# Patient Record
Sex: MALE | Race: WHITE | NOT HISPANIC OR LATINO | Employment: FULL TIME | URBAN - METROPOLITAN AREA
[De-identification: names, ages, dates, MRNs, and addresses within clinical notes are randomized per-mention and may not be internally consistent; named-entity substitution may affect disease eponyms.]

---

## 2021-09-02 ENCOUNTER — APPOINTMENT (OUTPATIENT)
Dept: GENERAL RADIOLOGY | Facility: HOSPITAL | Age: 61
End: 2021-09-02

## 2021-09-02 PROCEDURE — 73030 X-RAY EXAM OF SHOULDER: CPT | Performed by: FAMILY MEDICINE

## 2021-09-08 ENCOUNTER — TREATMENT (OUTPATIENT)
Dept: PHYSICAL THERAPY | Facility: CLINIC | Age: 61
End: 2021-09-08

## 2021-09-08 ENCOUNTER — TRANSCRIBE ORDERS (OUTPATIENT)
Dept: OCCUPATIONAL THERAPY | Facility: CLINIC | Age: 61
End: 2021-09-08

## 2021-09-08 DIAGNOSIS — S46.912D STRAIN OF LEFT SHOULDER, SUBSEQUENT ENCOUNTER: Primary | ICD-10-CM

## 2021-09-08 DIAGNOSIS — S46.912D LEFT SHOULDER STRAIN, SUBSEQUENT ENCOUNTER: Primary | ICD-10-CM

## 2021-09-08 DIAGNOSIS — R68.89 IMPAIRED FUNCTION OF UPPER EXTREMITY: ICD-10-CM

## 2021-09-08 PROCEDURE — 97110 THERAPEUTIC EXERCISES: CPT | Performed by: PHYSICAL THERAPIST

## 2021-09-08 PROCEDURE — 97014 ELECTRIC STIMULATION THERAPY: CPT | Performed by: PHYSICAL THERAPIST

## 2021-09-08 PROCEDURE — 97162 PT EVAL MOD COMPLEX 30 MIN: CPT | Performed by: PHYSICAL THERAPIST

## 2021-09-08 PROCEDURE — 97140 MANUAL THERAPY 1/> REGIONS: CPT | Performed by: PHYSICAL THERAPIST

## 2021-09-08 NOTE — PROGRESS NOTES
Physical Therapy Initial Evaluation and Plan of Care    Patient: Arden Jaramillo   : 1960  Diagnosis/ICD-10 Code:  Strain of left shoulder, subsequent encounter [S46.912D]  Referring practitioner: OSKAR Mccartney    Subjective Evaluation    History of Present Illness  Date of onset: 2021  Mechanism of injury: While pulling materials off of shelf, tripped over material and fell landing on left shoulder - unable to raise the shoulder after wards  Went to Urgent Care - Xrays & Diclofenac  Got meds filled  [facility] next day - cont with Diclofenac and referred to PT  Has grandchildren, hunts, fishes, boats, [place], [place]       Patient Occupation: Manufacturing, , shipping receiving large rubber materials Pain  Current pain ratin  At best pain ratin  At worst pain ratin  Location: superior/anterior left shoulder, had some 5th digit tingling, cant raise his arm, does have some popping  Quality: knife-like, sharp and dull ache  Relieving factors: rest, support, medications and ice  Aggravating factors: lifting, outstretched reach, repetitive movement, prolonged positioning, sleeping and overhead activity (pronation/supination)  Progression: improved    Social Support  Lives with: spouse    Hand dominance: left    Diagnostic Tests  X-ray: normal    Treatments  Previous treatment: medication  Current treatment: medication and physical therapy  Patient Goals  Patient goal: get better, through darts without pain           Objective          Postural Observations  Seated posture: poor  Standing posture: poor    Additional Postural Observation Details  Protraction of shoulders    Observations     Additional Shoulder Observation Details  Protracted shoulders    Palpation     Right   Hypertonic in the upper trapezius. Tenderness of the anterior deltoid, biceps and supraspinatus.     Tenderness     Right Shoulder  Tenderness in the biceps tendon (proximal), bicipital groove and  supraspinatus tendon.     Neurological Testing     Sensation     Shoulder   Left Shoulder   Intact: light touch    Active Range of Motion   Left Shoulder   Flexion: 40 degrees with pain  Extension: 30 degrees with pain  Abduction: 22 degrees   External rotation 45°: 10 degrees with pain  Internal rotation 45°: 20 degrees with pain    Passive Range of Motion   Left Shoulder   Flexion: 50 degrees with pain  Abduction: 45 degrees with pain  External rotation 45°: 30 degrees with pain  Internal rotation 45°: 40 degrees with pain    Strength/Myotome Testing     Left Shoulder     Planes of Motion   Flexion: 3-   Extension: 4   Abduction: 3-   External rotation at 0°: 3-   Internal rotation at 0°: 4     Additional Strength Details  Pain inhibition with all strength testing of the left shoulder     Tests     Left Shoulder   Positive drop arm.   Negative AC shear.     Additional Tests Details  Unable to tolerate much testing due to pain and guarding of the shoulder          Assessment & Plan     Assessment  Impairments: abnormal muscle firing, abnormal muscle tone, abnormal or restricted ROM, activity intolerance, impaired physical strength, lacks appropriate home exercise program and pain with function  Assessment details: 61 y.o. male with left shoulder strain after falling and landing on the shoulder presents with: 1 constant left shoulder pain, 2. Decreased shoulder AROM, 3. Significantly decreased muscle control of shoulder, 4. Positive Drop Arm test, 5. Suboptimal exam due to severity of symptoms  Prognosis: fair  Functional Limitations: carrying objects, lifting, uncomfortable because of pain, reaching behind back, reaching overhead and unable to perform repetitive tasks  Goals  Plan Goals: Short Term Goals: 2 weeks  Patient will be able to tolerate initial exercises  Patient will have pain <5/10  Patient will be able to sleep without pain interruption  Patient will be able to actively flex his shoulder to 90* to aid  in ADL's    Long Term Goals: 4 weeks  Patient will be independent in performing home exercise program.  Patient will have functional pain free shoulder AROM  Patient will be able to open and close his car door without increased symptoms  Patient will return to work with min/no restrictions      Plan  Therapy options: will be seen for skilled physical therapy services  Planned modality interventions: ultrasound, cryotherapy and TENS  Planned therapy interventions: manual therapy, strengthening, stretching, joint mobilization, postural training and home exercise program  Frequency: 3x week  Duration in visits: 12  Duration in weeks: 4  Treatment plan discussed with: patient  Plan details: Access Code: [email or web address]  URL: [email or web address]  Date: 09/08/2021  Prepared by: Asya Laguerre    Exercises  Circular Shoulder Pendulum with Table Support - 1 x daily - 7 x weekly - 1 sets - 10 reps  Seated Shoulder Flexion Towel Slide at Table Top - 1 x daily - 7 x weekly - 1 sets - 10 reps  Standing Elbow Flexion Extension AROM - 1 x daily - 7 x weekly - 3 sets - 10 reps  Wrist Circumduction AROM - 1 x daily - 7 x weekly - 3 sets - 10 reps          Manual Therapy:    14     mins  78631;  Therapeutic Exercise:    10     mins  80993;     Neuromuscular Evelyn:    0    mins  75474;    Therapeutic Activity:     5     mins  05679; Rehab process    Ultrasound                       8 mins    Evaluation Time:     20  mins  Timed Treatment:   37   mins   Total Treatment:     75   mins    PT SIGNATURE: Asya Laguerre, PT   DATE TREATMENT INITIATED: 9/8/2021    Initial Certification  Certification Period: 12/7/2021  I certify that the therapy services are furnished while this patient is under my care.  The services outlined above are required by this patient, and will be reviewed every 90 days.     PHYSICIAN: Steve Coon, OSKAR ______________________________________      DATE: ____________________________    Please sign and return via  fax to [phone].. Thank you, [facility].

## 2021-09-10 ENCOUNTER — TREATMENT (OUTPATIENT)
Dept: PHYSICAL THERAPY | Facility: CLINIC | Age: 61
End: 2021-09-10

## 2021-09-10 DIAGNOSIS — R68.89 IMPAIRED FUNCTION OF UPPER EXTREMITY: ICD-10-CM

## 2021-09-10 DIAGNOSIS — S46.912D STRAIN OF LEFT SHOULDER, SUBSEQUENT ENCOUNTER: Primary | ICD-10-CM

## 2021-09-10 PROCEDURE — 97110 THERAPEUTIC EXERCISES: CPT | Performed by: PHYSICAL THERAPIST

## 2021-09-10 PROCEDURE — 97014 ELECTRIC STIMULATION THERAPY: CPT | Performed by: PHYSICAL THERAPIST

## 2021-09-10 PROCEDURE — 97035 APP MDLTY 1+ULTRASOUND EA 15: CPT | Performed by: PHYSICAL THERAPIST

## 2021-09-10 PROCEDURE — 97140 MANUAL THERAPY 1/> REGIONS: CPT | Performed by: PHYSICAL THERAPIST

## 2021-09-10 NOTE — PROGRESS NOTES
Physical Therapy Daily Progress Note        VISIT#: 2      Arden Jaramillo reports: His L shoulder still has a lot of pain but it is a bit better today. His exercises seem to be helping and he can move his shoulder more than he could on Wednesday. He has difficulty abducting and flexing/extending his shoulder. Stated he is getting tingling down his arm and into his fingers - mostly in his pinky.   Current Pain Level:    1-2/10; Worst:   8/10; Best:  1-2/10  Location Of Pain: L shoulder, sub acromial  Response to Previous Session: Good. No issues  Functional Deficits/Irritating Factors: Sleeping/lying on his arm, reaching, lifting, carrying, pushing, pulling  Progression: improving  Compliance with HEP Reported: Yes    Objective   Presents: guarded posture w/LUE  Increased sets/reps of:  none   Increased resistance on:  none  Added to Program: Scapular pinches, Incline table slides, pulleys    PROM L shoulder flexion = approx 60 deg; ER = approx 45 deg; Abd = approx 80 deg      See Exercise, Manual, and Modality Logs for complete treatment.     Patient Education: Pt was educated on exercise biomechanical correctness, intensity, and speed.     Assessment:  Pt is improving. He is moving his shoulder more and his range has increased a little. Pain and guarding are significant. Was able to introduce a couple of new exercise interventions and were tolerated well. Pt will continue to benefit from skilled PT interventions to address current functional deficits and impairments.       Plan: Progress to/Continue with current program. Progress per pt tolerance with RC strengthening and scapular stabilization.         Manual Therapy:    10     mins  68211;  Ultrasound:   10 mins 97842  Electrical Stimulation: __15____ mins 57242/  Iontophoresis: 0 mins 13770  Traction: 0 mins  13263  Work Conditionin mins 83500  Therapeutic Exercise:    20     mins  91937;     Neuromuscular Evelyn:    0    mins  53004;    Therapeutic  Activity:     0     mins  36179;     Timed Treatment:   30   mins   Total Treatment:     55   mins    Alison Herrera, TAMRA  [place] License # [id number]  Physical Therapist Assistant

## 2021-09-14 ENCOUNTER — TREATMENT (OUTPATIENT)
Dept: PHYSICAL THERAPY | Facility: CLINIC | Age: 61
End: 2021-09-14

## 2021-09-14 DIAGNOSIS — S46.912D STRAIN OF LEFT SHOULDER, SUBSEQUENT ENCOUNTER: Primary | ICD-10-CM

## 2021-09-14 DIAGNOSIS — R68.89 IMPAIRED FUNCTION OF UPPER EXTREMITY: ICD-10-CM

## 2021-09-14 PROCEDURE — 97140 MANUAL THERAPY 1/> REGIONS: CPT | Performed by: PHYSICAL THERAPIST

## 2021-09-14 PROCEDURE — 97110 THERAPEUTIC EXERCISES: CPT | Performed by: PHYSICAL THERAPIST

## 2021-09-14 PROCEDURE — 97530 THERAPEUTIC ACTIVITIES: CPT | Performed by: PHYSICAL THERAPIST

## 2021-09-14 PROCEDURE — 97014 ELECTRIC STIMULATION THERAPY: CPT | Performed by: PHYSICAL THERAPIST

## 2021-09-14 NOTE — PROGRESS NOTES
MD Letter  Patient: Arden Jaramillo   : 1960    Date of Initial Visit: Type: THERAPY  Noted: 2021  Today's Date: 2021  Patient seen for 3 sessions    Treatment has included: therapeutic exercise, neuromuscular re-education, manual therapy, ultrasound, iontophoresis and cryotherapy    Subjective   Arden reports that he is still having constant pain in the anterior/superior shoulder.  He states that the pain increases when reaching upward.  He states that he is still unable to reach out to the side because of pain.  He states that his pain varies from 6-8/10.  He reports that he is having some tingling into the 2-5th digits.       Objective - he present with slightly decreased arm swing on the left  Shoulder AROM - flexion 50*, abduction 32*  Strength - flexion, abduction 3/5 with pain, extension 4/5, ER 3+/5, IR 4/5 - all directions with pain inhibition   Sensation - intact at present but reports frequent tingling into the 2-5th digits  Palpation - tenderness to palpation of the supraspinatus and biceps tendons  Special tests - Positive Drop Arm, unable to perform other Tests due to pain  Activity tolerances - he is unable to sleep due to pain.  He is unable to lift his arm into any elevated position due to sharp pain.  If he keeps his elbow close to his side he is able to use the forearm, wrist and hand,    Assessment/Plan  Patient has demonstrated minimal improvement since the initiation of therapy.  The pain has actually increased with attempts of moving the arm.  The motion has increased only slightly.  The activity tolerances have remained extremely limited.  Due to the persistence of significant symptoms, I feel that the patient would benefit from further medical management.  If you have any questions concerning the care, please do not hesitate to contact me.          PT Signature: Asya Laguerre, PT        Manual Therapy:    12     mins  52981;  Therapeutic Exercise:    10     mins  28086;      Neuromuscular Evelyn:    0    mins  20722;    Therapeutic Activity:     10     mins  19525; Assessed for MD     Timed Treatment:   40   mins   Total Treatment:     60   mins

## 2021-10-26 ENCOUNTER — TRANSCRIBE ORDERS (OUTPATIENT)
Dept: PHYSICAL THERAPY | Facility: CLINIC | Age: 61
End: 2021-10-26

## 2021-10-26 DIAGNOSIS — M75.102 NON-TRAUMATIC ROTATOR CUFF TEAR, LEFT: Primary | ICD-10-CM

## 2021-11-02 ENCOUNTER — TREATMENT (OUTPATIENT)
Dept: PHYSICAL THERAPY | Facility: CLINIC | Age: 61
End: 2021-11-02

## 2021-11-02 DIAGNOSIS — R68.89 IMPAIRED FUNCTION OF UPPER EXTREMITY: ICD-10-CM

## 2021-11-02 DIAGNOSIS — S46.012D TRAUMATIC INCOMPLETE TEAR OF LEFT ROTATOR CUFF, SUBSEQUENT ENCOUNTER: Primary | ICD-10-CM

## 2021-11-02 DIAGNOSIS — M25.512 ACUTE PAIN OF LEFT SHOULDER: ICD-10-CM

## 2021-11-02 PROCEDURE — 97530 THERAPEUTIC ACTIVITIES: CPT | Performed by: PHYSICAL THERAPIST

## 2021-11-02 PROCEDURE — 97162 PT EVAL MOD COMPLEX 30 MIN: CPT | Performed by: PHYSICAL THERAPIST

## 2021-11-02 PROCEDURE — 97110 THERAPEUTIC EXERCISES: CPT | Performed by: PHYSICAL THERAPIST

## 2021-11-02 PROCEDURE — 97140 MANUAL THERAPY 1/> REGIONS: CPT | Performed by: PHYSICAL THERAPIST

## 2021-11-02 NOTE — PROGRESS NOTES
Physical Therapy Initial Evaluation and Plan of Care    Patient: Arden Jaramillo   : 1960  Diagnosis/ICD-10 Code:  Traumatic incomplete tear of left rotator cuff, subsequent encounter [S46.012D]  Referring practitioner: Mello Grimm MD  Today's Date: 2021    Subjective Evaluation    History of Present Illness  Date of onset: 2021  Mechanism of injury: Fell after reaching down to get material, then tripped and fell landing on his left shoulder - [facility] - xrays and medications  Referred to PT - few sessions - back to MD  Saw Dr Grimm - referred him for MRI - significant tear in RTC - some chronic component and some acuter component   Had injection in the shoulder 10/25/21  Some relief from the injection      Patient Occupation: production, maintenance, shipping/receiving  Pain  Current pain ratin  At best pain ratin  At worst pain ratin  Location: lateral shoulder ache constant, superior shoulder pain with elevation, tingling in 4 fingers constantly (shoulder pops frequently - always painful with pop)  Quality: sharp, knife-like, dull ache and tight (anteiror shoulder pain was not there until the steroid injetion, now it is constant)  Relieving factors: rest, relaxation and change in position  Aggravating factors: sleeping, overhead activity, outstretched reach, repetitive movement and lifting  Progression: improved    Hand dominance: left    Diagnostic Tests  X-ray: abnormal  MRI studies: abnormal    Treatments  Previous treatment: medication and injection treatment  Current treatment: physical therapy  Patient Goals  Patient goal: Get arm back to pre fall status           Objective          Postural Observations  Seated posture: fair  Standing posture: fair    Additional Postural Observation Details  Protracted shoulders    Observations     Additional Shoulder Observation Details  Holds arm close to side in elbow flexed position     Palpation   Left   Tenderness of the anterior deltoid,  infraspinatus and supraspinatus.     Tenderness     Left Shoulder   Tenderness in the acromion and supraspinatus tendon. No tenderness in the biceps tendon (proximal) and medial scapula.     Neurological Testing     Sensation     Shoulder   Left Shoulder   Diminished: light touch    Comments   Left light touch: 2-4th digits    Active Range of Motion   Left Shoulder   Flexion: 72 (pain at 65) degrees with pain  Extension: 45 degrees   Abduction: 55 degrees with pain  External rotation 45°: 40 degrees   Internal rotation 45°: 40 degrees     Passive Range of Motion   Left Shoulder   Flexion: 145 degrees   Abduction: 120 (scaption) degrees   External rotation 45°: 60 degrees   Internal rotation 45°: 50 degrees     Strength/Myotome Testing     Left Shoulder     Planes of Motion   Flexion: 3+ (pain inhibition)   Extension: 4+   Abduction: 3+ (pain inhibition)   External rotation at 0°: 3+   Internal rotation at 0°: 4-     Tests     Left Shoulder   Positive empty can, Hawkin's, Neer's, painful arc and Speed's.   Negative drop arm.           Assessment & Plan     Assessment  Impairments: abnormal muscle firing, abnormal muscle tone, abnormal or restricted ROM, activity intolerance, impaired physical strength, lacks appropriate home exercise program, pain with function and safety issue  Assessment details: 61 y.o. male with left shoulder RTC tear after a fall presents with: 1. Constant left shoulder pain, 2. Decreased left shoulder active and passive motion, 3. Tenderness to palpation lateral subacromial space, 4. Positive Empty Can and Impingement tests, 5. Considerable weakness in shoulder mm, 6. 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  Patient will have functional pain free shoulder AROM  Patient will be able to perform all self care skills without increased symptoms  Patient will return to work with min/no restrictions        Plan  Therapy options: will be seen for skilled physical therapy services  Planned modality interventions: electrical stimulation/Russian stimulation and cryotherapy  Planned therapy interventions: manual therapy, joint mobilization, strengthening, stretching, therapeutic activities, home exercise program and postural training  Frequency: 3x week  Duration in visits: 12  Duration in weeks: 4  Treatment plan discussed with: patient  Plan details: Access Code: [email or web address]  URL: [email or web address]  Date: 11/02/2021  Prepared by: Asya Laguerre    Exercises  Circular Shoulder Pendulum with Table Support - 1 x daily - 7 x weekly - 4 sets - 10 reps  Standing Bent Over Single Arm Scapular Row with Table Support - 1 x daily - 7 x weekly - 2 sets - 10 reps  Standing Bent Over on Chair Single Arm Tricep Extension with Dumbbell - 1 x daily - 7 x weekly - 2 sets - 10 reps  Sidelying Shoulder External Rotation - 1 x daily - 7 x weekly - 2 sets - 10 reps  Supine Single Arm Shoulder Protraction - 1 x daily - 7 x weekly - 2 sets - 10 reps  Seated Shoulder Flexion Towel Slide at Table Top - 1 x daily - 7 x weekly - 2 sets - 10 reps          Manual Therapy:    18     mins  69815;  Therapeutic Exercise:    15     mins  59293;     Neuromuscular Evelyn:    0    mins  72647;    Therapeutic Activity:     10     mins  78509;   Anatomy review, rehab process, home positioning   Ultrasound                  __0_  mins  07981  Iontophoresis                 0    mins   03775    Electrical Stimulation     0    mins  08798 ([id number])  Traction                         _      mins  80971     Evaluation Time:     20  mins  Timed Treatment:   43   mins   Total Treatment:     80   mins    PT SIGNATURE: Asya Laguerre PT   [place] LICENSE:  [id number]      Initial Certification  Certification Period: 1/30/2022  I certify that the therapy services are furnished while this patient is under my care.  The services outlined above are required by this patient, and will be reviewed every 90 days.     PHYSICIAN: Mello Grimm MD  ___________________________________-       DATE: ____________________________    Please sign and return via fax to [phone].. Thank you, [facility].

## 2021-11-04 ENCOUNTER — TREATMENT (OUTPATIENT)
Dept: PHYSICAL THERAPY | Facility: CLINIC | Age: 61
End: 2021-11-04

## 2021-11-04 DIAGNOSIS — S46.012D TRAUMATIC INCOMPLETE TEAR OF LEFT ROTATOR CUFF, SUBSEQUENT ENCOUNTER: Primary | ICD-10-CM

## 2021-11-04 DIAGNOSIS — M25.512 ACUTE PAIN OF LEFT SHOULDER: ICD-10-CM

## 2021-11-04 DIAGNOSIS — R68.89 IMPAIRED FUNCTION OF UPPER EXTREMITY: ICD-10-CM

## 2021-11-04 PROCEDURE — 97140 MANUAL THERAPY 1/> REGIONS: CPT | Performed by: PHYSICAL THERAPIST

## 2021-11-04 PROCEDURE — 97110 THERAPEUTIC EXERCISES: CPT | Performed by: PHYSICAL THERAPIST

## 2021-11-04 PROCEDURE — 97530 THERAPEUTIC ACTIVITIES: CPT | Performed by: PHYSICAL THERAPIST

## 2021-11-04 NOTE — PROGRESS NOTES
Physical Therapy Daily Progress Note    Patient: Arden Jaramillo   : 1960  Referring practitioner: Mello Grimm MD  Today's Date: 2021  Patient seen for 2 sessions    Visit Diagnoses:  No diagnosis found.    Subjective   Arden Jaramillo reports: that he feels much better since the injection.  Pain is not constant but with any elevation of the arm he has a sharp pain in the superior lateral aspect of the shoulder.  He notes that his left hand is still numb and that he has been dropping things as he cant feel them well.  Reports compliance with his HEP.      Objective   Active shoulder flexion & abduction painful with initiation of movement    See Exercise, Manual, and Modality Logs for complete treatment.     Patient Education: need for continued flexibility of the joint    Assessment/Plan  patient has good passive motion but considerable pain at mid range flexion and abduction as well as with any active elevation of the shoulder.      Progress strengthening /stabilization /functional activity  To MD after next visit         Timed:  Manual Therapy:    17     mins  98768;  Therapeutic Exercise:    25     mins  51132;     Neuromuscular Evelyn:    0    mins  51636;    Therapeutic Activity:     10     mins  10865; Anatomy review, review of MRI, possible outcomes of surgery vs non surgical approach    Ultrasound:      0     mins  87024;    Iontophoresis              __0_   mins  Dry Needling               ____    mins        Untimed:  Electrical Stimulation:     0     mins  28502 ( );  Mechanical Traction:             mins  02897;     Timed Treatment:   52   mins   Total Treatment:     70   mins    Asya Laguerre, PT  [place] License # [id number]  Physical Therapist

## 2021-11-08 ENCOUNTER — TREATMENT (OUTPATIENT)
Dept: PHYSICAL THERAPY | Facility: CLINIC | Age: 61
End: 2021-11-08

## 2021-11-08 DIAGNOSIS — M25.512 ACUTE PAIN OF LEFT SHOULDER: ICD-10-CM

## 2021-11-08 DIAGNOSIS — R68.89 IMPAIRED FUNCTION OF UPPER EXTREMITY: ICD-10-CM

## 2021-11-08 DIAGNOSIS — S46.012D TRAUMATIC INCOMPLETE TEAR OF LEFT ROTATOR CUFF, SUBSEQUENT ENCOUNTER: Primary | ICD-10-CM

## 2021-11-08 PROCEDURE — 97110 THERAPEUTIC EXERCISES: CPT | Performed by: PHYSICAL THERAPIST

## 2021-11-08 PROCEDURE — 97530 THERAPEUTIC ACTIVITIES: CPT | Performed by: PHYSICAL THERAPIST

## 2021-11-08 PROCEDURE — 97140 MANUAL THERAPY 1/> REGIONS: CPT | Performed by: PHYSICAL THERAPIST

## 2021-11-08 NOTE — PROGRESS NOTES
MD Letter  Patient: Arden Jaramillo   : 1960    Date of Initial Visit: Type: THERAPY  Noted: 2021  Today's Date: 2021  Patient seen for 3 sessions    Treatment has included: therapeutic exercise, manual therapy, therapeutic activity, ultrasound and cryotherapy    Subjective   Arden states that his shoulder feels better since the injection but is still quite painful.  He states that he still does not have the strength to lift the arm.  He states that he still has constant pain (2/10) in the arm which increases with activity, with the most pain noted with active flexion and abduction (9/10).  He does have some pain with attempts of putting his belt through the belt loops.     Objective   Shoulder AROM/PROM - Flexion 78/150*  Abduction 58/150*,  ER 40/80*,  IR to the beltline  Strength - pain inhibition with all shoulder strength testing  Sensation - intact  Palpation - tenderness in the lateral and posterior subacromial space  Special tests - positive Emtpy Can and Jaime Josh Impingement tests  Activity tolerances - he has been able to sleep fairly well but does awaken a few times a night due to shoulder pain.      Assessment/Plan  Patient has demonstrated minimal improvement since the initiation of therapy.  The pain has decreased only slight.  The motion has increased.  The activity tolerances have remained quite limited.  I feel that the patient would benefit from further medical intervention vs continued therapy.  If you have any questions concerning the care, please do not hesitate to contact me.          PT Signature: Asya Laguerre, PT        Manual Therapy:    12     mins  12066;  Therapeutic Exercise:    20     mins  27080;     Neuromuscular Evelyn:    0    mins  46240;    Therapeutic Activity:     10     mins  24771;   Assessed status    Timed Treatment:   42   mins   Total Treatment:     60   mins

## 2022-01-26 ENCOUNTER — TRANSCRIBE ORDERS (OUTPATIENT)
Dept: ADMINISTRATIVE | Facility: HOSPITAL | Age: 62
End: 2022-01-26

## 2022-01-26 ENCOUNTER — HOSPITAL ENCOUNTER (OUTPATIENT)
Dept: GENERAL RADIOLOGY | Facility: HOSPITAL | Age: 62
Discharge: HOME | End: 2022-01-26

## 2022-01-26 ENCOUNTER — TRANSCRIBE ORDERS (OUTPATIENT)
Dept: CARDIOLOGY | Facility: HOSPITAL | Age: 62
End: 2022-01-26

## 2022-01-26 ENCOUNTER — HOSPITAL ENCOUNTER (OUTPATIENT)
Dept: CARDIOLOGY | Facility: HOSPITAL | Age: 62
Discharge: HOME | End: 2022-01-26

## 2022-01-26 ENCOUNTER — LAB (OUTPATIENT)
Dept: LAB | Facility: HOSPITAL | Age: 62
End: 2022-01-26

## 2022-01-26 DIAGNOSIS — M25.512 LEFT SHOULDER PAIN, UNSPECIFIED CHRONICITY: Primary | ICD-10-CM

## 2022-01-26 DIAGNOSIS — Z01.811 PRE-OP CHEST EXAM: Primary | ICD-10-CM

## 2022-01-26 DIAGNOSIS — M25.512 LEFT SHOULDER PAIN, UNSPECIFIED CHRONICITY: ICD-10-CM

## 2022-01-26 DIAGNOSIS — Z01.818 PREOP EXAMINATION: ICD-10-CM

## 2022-01-26 LAB
ALBUMIN SERPL-MCNC: 4.2 G/DL (ref 3.5–5.2)
ALBUMIN/GLOB SERPL: 1.6 G/DL
ALP SERPL-CCNC: 77 U/L (ref 39–117)
ALT SERPL W P-5'-P-CCNC: 52 U/L (ref 1–41)
ANION GAP SERPL CALCULATED.3IONS-SCNC: 9.3 MMOL/L (ref 5–15)
AST SERPL-CCNC: 40 U/L (ref 1–40)
BILIRUB SERPL-MCNC: 0.4 MG/DL (ref 0–1.2)
BUN SERPL-MCNC: 11 MG/DL (ref 8–23)
BUN/CREAT SERPL: 10.1 (ref 7–25)
CALCIUM SPEC-SCNC: 9.1 MG/DL (ref 8.6–10.5)
CHLORIDE SERPL-SCNC: 101 MMOL/L (ref 98–107)
CO2 SERPL-SCNC: 29.7 MMOL/L (ref 22–29)
CREAT SERPL-MCNC: 1.09 MG/DL (ref 0.76–1.27)
DEPRECATED RDW RBC AUTO: 40 FL (ref 37–54)
ERYTHROCYTE [DISTWIDTH] IN BLOOD BY AUTOMATED COUNT: 11.8 % (ref 12.3–15.4)
GFR SERPL CREATININE-BSD FRML MDRD: 69 ML/MIN/1.73
GLOBULIN UR ELPH-MCNC: 2.6 GM/DL
GLUCOSE SERPL-MCNC: 169 MG/DL (ref 65–99)
HCT VFR BLD AUTO: 43.8 % (ref 37.5–51)
HGB BLD-MCNC: 14.9 G/DL (ref 13–17.7)
MCH RBC QN AUTO: 31.4 PG (ref 26.6–33)
MCHC RBC AUTO-ENTMCNC: 34 G/DL (ref 31.5–35.7)
MCV RBC AUTO: 92.4 FL (ref 79–97)
PLATELET # BLD AUTO: 287 10*3/MM3 (ref 140–450)
PMV BLD AUTO: 10.3 FL (ref 6–12)
POTASSIUM SERPL-SCNC: 4.2 MMOL/L (ref 3.5–5.2)
PROT SERPL-MCNC: 6.8 G/DL (ref 6–8.5)
QT INTERVAL: 431 MS
RBC # BLD AUTO: 4.74 10*6/MM3 (ref 4.14–5.8)
SODIUM SERPL-SCNC: 140 MMOL/L (ref 136–145)
WBC NRBC COR # BLD: 7.95 10*3/MM3 (ref 3.4–10.8)

## 2022-01-26 PROCEDURE — 36415 COLL VENOUS BLD VENIPUNCTURE: CPT

## 2022-01-26 PROCEDURE — 80053 COMPREHEN METABOLIC PANEL: CPT

## 2022-01-26 PROCEDURE — 93005 ELECTROCARDIOGRAM TRACING: CPT

## 2022-01-26 PROCEDURE — 93010 ELECTROCARDIOGRAM REPORT: CPT | Performed by: INTERNAL MEDICINE

## 2022-01-26 PROCEDURE — 71046 X-RAY EXAM CHEST 2 VIEWS: CPT

## 2022-01-26 PROCEDURE — 85027 COMPLETE CBC AUTOMATED: CPT
